# Patient Record
(demographics unavailable — no encounter records)

---

## 2024-11-15 NOTE — BIRTH HISTORY
[Duration: ___ wks] : duration: [unfilled] weeks [] :  [___ lbs.] : [unfilled] lbs [___ oz.] : [unfilled] oz. [Normal?] : pregnancy not normal [Was child in NICU?] : Child was not in NICU [FreeTextEntry5] : at risk

## 2024-11-15 NOTE — PHYSICAL EXAM
[FreeTextEntry1] : General: Patient is awake and alert and in no acute distress . oriented to person, place, and time. well developed, well nourished, cooperative.   Skin: The skin is intact, warm, pink, and dry over the area examined.    Eyes: normal conjunctiva, normal eyelids and pupils were equal and round.   ENT: normal ears, normal nose and normal lips.  Cardiovascular: There is brisk capillary refill in the digits of the affected extremity. They are symmetric pulses in the bilateral upper and lower extremities, positive peripheral pulses, brisk capillary refill, but no peripheral edema.  Respiratory: The patient is in no apparent respiratory distress. They're taking full deep breaths without use of accessory muscles or evidence of audible wheezes or stridor without the use of a stethoscope, normal respiratory effort.   Musculoskeletal:.  Examination of the back reveals significant shoulder asymmetry with left shoulder higher than right. Scapular asymmetry with left higher than right. Right sided waist crease noted. On forward bending, significant left thoracolumbar prominence noted.  Patient is able to bend forward and touch the toes as well bend backwards without pain.  Lateral flexion is symmetrical and is pain free.  Straight leg raising test is free to more than 70 degrees. Moderate postural kyphosis, fully correctable on hyperextension.  Neurological examination reveals a grade 5/5 muscle power.  Sensation is intact to crude touch and pinprick.  Deep tendon reflexes are 1+ with ankle jerk and knee jerk.  The plantars are bilaterally down going.  Superficial abdominal reflexes are symmetric and intact.  The biceps and triceps reflexes are 1+.     There is no hairy patch, lipoma, sinus in the back.  There is no pes cavus, asymmetry of calves, significant leg length discrepancy or significant cafe-au-lait spots.  Child is able to walk on tiptoes as well as heels without difficulty or pain. Child is able to jump and squat

## 2024-11-15 NOTE — DATA REVIEWED
[de-identified] : AP and lateral spine radiographs were ordered, obtained, and independently reviewed in clinic on 11/11/2024 depicting a right thoracic curve measuring 25 degrees and a left thoracolumbar curve measuring 40 degrees. Patient is Risser 1-2. Hypokyphosis noted on the lateral plane. No evidence of spondylolysis or spondylolisthesis.

## 2024-11-15 NOTE — HISTORY OF PRESENT ILLNESS
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 Mother reports that their pediatrician recently noticed asymmetries of the back at annual visit in September 2024 and advised family to follow up with an orthopedist. This was not noted on previous annual examinations. Patient denies any recent fevers, chills, or night sweats. Denies any recent trauma or injuries. He denies any back pain, radiating pain, numbness, tingling sensations, discomfort, weakness to LE, radiating LE pain, or bladder/bowel dysfunction. He has been participating in all his normal physical activities without restrictions or discomfort. Mother denies any family history of scoliosis. Here today for further orthopedic evaluation.  The patient's HPI was reviewed thoroughly with patient and parent. The patient's parent has acted as an independent historian regarding the above information due to the unreliable nature of the history obtained from the patient.

## 2024-11-15 NOTE — REVIEW OF SYSTEMS
[NI] : Endocrine [Nl] : Hematologic/Lymphatic [Change in Activity] : no change in activity [Fever Above 102] : no fever [Malaise] : no malaise [Rash] : no rash [Itching] : no itching [Nosebleeds] : no epistaxis [Joint Pains] : no arthralgias [Joint Swelling] : no joint swelling [Back Pain] : ~T no back pain

## 2024-11-15 NOTE — DATA REVIEWED
[de-identified] : AP and lateral spine radiographs were ordered, obtained, and independently reviewed in clinic on 11/11/2024 depicting a right thoracic curve measuring 25 degrees and a left thoracolumbar curve measuring 40 degrees. Patient is Risser 1-2. Hypokyphosis noted on the lateral plane. No evidence of spondylolysis or spondylolisthesis.

## 2024-11-15 NOTE — ASSESSMENT
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   Today's assessment was performed with the assistance of the patient's parent as an independent historian given the patient's age. Clinical findings and x-ray results were reviewed at length with the patient and parent. We discussed at length the natural history, etiology, pathoanatomy and treatment modalities of scoliosis with patient and parent. Patient's obtained radiographs are remarkable for scoliosis with a right thoracic curve measuring 25 degrees and a left thoracolumbar curve measuring 40 degrees. Hypokyphosis noted on lateral film. Explained to patient and parent that for curves measuring 25 degrees, a brace regimen is typically implemented for treatment. For curves of 45 degrees or more, surgical intervention is warranted. Given the fact that the patient is 17 years of age, and Risser 1-2, patient has significant spinal growth remaining. This is a sizable curve. I am recommending a brace, a TLSO to be worn 14 hours everyday and to use it snug. The patient was fitted for their TLSO brace by [facility] in the clinic today. The mother understands that the braces do not correct curves permanently and that there is a 30% risk of brace failure. Parents understand the risk of curve progression needing surgery. Surgery is usually recommended for curves 40-45 degrees or more. Patient may continue participating in all physical activities without restrictions. All questions and concerns were addressed. Patient and parent vocalized understanding and agreement to assessment and treatment plan. I am recommending follow up in two months. Scoliosis PA XR's will be done in the brace.    Natural history of spine deformity discussed. Risk of progression explained. Spine deformity can cause back pain later on and also arthritis, though usually later. Spine deformity can affect organ systems, such as lungs, less commonly heart and GI etc over time depending on curve size and progression. Deformity can progress with growth but can continue to progress later on based on the size of the curve. It can also effect patient's height due to the curve..It usually does not impact activities and has no limitations, however activities may be limited due to pain or rarely breathlessness with large curves. Scoliosis is usually not impacted by daily activities- sleeping position, sitting position, lifting heavy weights etc, however posture and back pain can be affected by some of these.Stretching, exercises, bone health and nutrition are important factors in the long run. Spine deformity may have genetics etiology and so siblings and progenies should be evaluated.For scoliosis, curves less than 25 degrees are usually managed with observation. Bracing is warranted for curves measuring greater than 25 degrees with skeletal growth remaining. Braces do not correct curves permanently and there is a 30% risk brace failure. Surgery is recommended for scoliosis measuring greater than 45 degrees.  Parent served as the primary historian regarding the above information for this visit to corroborate the patient's history. We also discussed/instructed back, core strengthening and posture correction exercises and going over the proper form as well the need to be regular on a daily basis. Importance was discussed and instructions printed.   I, Torsten Salomon, have acted as a scribe and documented the above information for Dr. Martin on 11/11/2024.   We spent 45 minutes on HPI, Clinical exam, ordering/ reviewing all imaging, reviewing any existing record, reviewing findings and counseling patient to treatment, differentials, etiology, prognosis, natural history, implications on ADLs, activities limitations/modifications, answering questions and addressing concerns, treatment goals and documenting in the EHR.

## 2025-07-17 NOTE — BIRTH HISTORY
[Duration: ___ wks] : duration: [unfilled] weeks [Normal?] : pregnancy not normal [] :  [___ lbs.] : [unfilled] lbs [___ oz.] : [unfilled] oz. [Was child in NICU?] : Child was not in NICU [FreeTextEntry5] : at risk

## 2025-07-17 NOTE — DEVELOPMENTAL MILESTONES
[See scanned document for history] : See scanned document for history [Roll Over: ___ Months] : Roll Over: [unfilled] months [Sit Up: ___ Months] : Sit Up: [unfilled] months [Pull Self to Stand ___ Months] : Pull self to stand: [unfilled] months [Walk ___ Months] : Walk: [unfilled] months [Verbally] : verbally [Right] : right [FreeTextEntry3] : [id number] December 2024

## 2025-07-17 NOTE — REASON FOR VISIT
[Follow Up] : a follow up visit [FreeTextEntry1] : scoliosis with brace [Patient] : patient [Father] : father

## 2025-07-17 NOTE — ASSESSMENT
[FreeTextEntry1] : Rudolph is an 18-year-old male with hx of [place]'s syndrome and now adolescent idiopathic scoliosis.   Today's assessment was performed with the assistance of the patient's parent as an independent historian given the patient's age. Clinical findings and x-ray results were reviewed at length with the patient and parent. We discussed at length the natural history, etiology, pathoanatomy and treatment modalities of scoliosis with patient and parent. Patient's obtained radiographs in brace are remarkable for scoliosis with a right thoracic curve measuring 25 degrees and a left thoracolumbar curve measuring 40 degrees.  No significant correction of scoliosis in brace.  Treatment algorithm for scoliosis has been discussed.  Explained to patient and parent that for curves measuring 25 degrees, a brace regimen is typically implemented for treatment. For curves of 45 degrees or more, surgical intervention is warranted. Given the fact that the patient is 17 years of age, and Risser 1-2, patient has significant spinal growth remaining. This is a sizable curve. I am recommending a brace, a TLSO to be worn 14 hours everyday and to use it snug.  Patient was evaluated by [facility] orthotist in the office today for fit and function and brace adjustments will be needed to improve correction of scoliosis in brace.  We have recommended further evaluation by [facility] for brace adjustments to be done..The patient and father understand that the braces do not correct curves permanently and that there is a 30% risk of brace failure. Parents understand the risk of curve progression needing surgery. Surgery is usually recommended for curves 40-45 degrees or more. Patient may continue participating in all physical activities without restrictions. All questions and concerns were addressed. Patient and parent vocalized understanding and agreement to assessment and treatment plan. I am recommending follow up in 4 months.  Scoliosis x-rays to be obtained out of brace with 24-hour brace holiday prior to scheduled visit.  X-rays in brace can also be obtained to evaluate correction of scoliosis in brace following brace adjustments.  Likely need for surgery in the future for scoliosis has been discussed.   Natural history of spine deformity discussed. Risk of progression explained. Spine deformity can cause back pain later on and also arthritis, though usually later. Spine deformity can affect organ systems, such as lungs, less commonly heart and GI etc over time depending on curve size and progression. Deformity can progress with growth but can continue to progress later on based on the size of the curve. It can also effect patient's height due to the curve..It usually does not impact activities and has no limitations, however activities may be limited due to pain or rarely breathlessness with large curves. Scoliosis is usually not impacted by daily activities- sleeping position, sitting position, lifting heavy weights etc, however posture and back pain can be affected by some of these.Stretching, exercises, bone health and nutrition are important factors in the long run. Spine deformity may have genetics etiology and so siblings and progenies should be evaluated.For scoliosis, curves less than 25 degrees are usually managed with observation. Bracing is warranted for curves measuring greater than 25 degrees with skeletal growth remaining. Braces do not correct curves permanently and there is a 30% risk brace failure. Surgery is recommended for scoliosis measuring greater than 45 degrees.  This note was generated using Dragon medical dictation software. A reasonable effort has been made for proofreading its contents, but typos may still remain. If there are any questions or points of clarification needed please do not hesitate to contact my office.  We spent 30 minutes on HPI, Clinical exam, ordering/ reviewing all imaging, reviewing any existing record, reviewing findings and counseling patient to treatment, differentials,etiology, prognosis, natural history, implications on ADLs, activities limitations/modifications, genetics, answering questions and addressing concerns, treatment goals and documenting in the EHR.

## 2025-07-17 NOTE — DATA REVIEWED
[de-identified] : 7/17/2025: AP and lateral full-length standing spine x-ray in brace ordered, obtained and independently reviewed today revealing no significant correction of scoliosis in brace with left thoracolumbar curve measuring about 40 degrees.  Risser 2-3.  AP and lateral spine radiographs were ordered, obtained, and independently reviewed in clinic on 11/11/2024 depicting a right thoracic curve measuring 25 degrees and a left thoracolumbar curve measuring 40 degrees. Patient is Risser 1-2. Hypokyphosis noted on the lateral plane. No evidence of spondylolysis or spondylolisthesis.

## 2025-07-17 NOTE — REVIEW OF SYSTEMS
[Change in Activity] : no change in activity [Fever Above 102] : no fever [Malaise] : no malaise [Rash] : no rash [Itching] : no itching [Nosebleeds] : no epistaxis [Joint Pains] : no arthralgias [Joint Swelling] : no joint swelling [Back Pain] : ~T no back pain [NI] : Endocrine [Nl] : Hematologic/Lymphatic [No Acute Changes] : No acute changes since previous visit

## 2025-07-17 NOTE — HISTORY OF PRESENT ILLNESS
[FreeTextEntry1] : Rudolph is an 18-year-old male with hx of [place]'s syndrome who presents today with his father for for follow-up regarding scoliosis.  He was last seen in this office 11/11/2020 for a TLSO was recommended for scoliosis measuring about 40 degrees.  He reports that he obtain brace a few months ago from [facility].  He reports significant noncompliance with brace.  He wears about 5 hours a few days per week.  He wears brace mostly with sleep.  He does not wear a brace snugly.  Father reports significant growth in height.  He denies any back pain, radiating pain, numbness, tingling sensations, discomfort, weakness to LE, radiating LE pain, or bladder/bowel dysfunction. He has been participating in all his normal physical activities without restrictions or discomfort. No known family history of scoliosis.  He presents today for brace check